# Patient Record
Sex: MALE | Race: WHITE
[De-identification: names, ages, dates, MRNs, and addresses within clinical notes are randomized per-mention and may not be internally consistent; named-entity substitution may affect disease eponyms.]

---

## 2018-05-20 ENCOUNTER — HOSPITAL ENCOUNTER (EMERGENCY)
Dept: HOSPITAL 41 - JD.ED | Age: 33
Discharge: NURSING, REHAB OR LONG-TERM CARE | End: 2018-05-20
Payer: COMMERCIAL

## 2018-05-20 DIAGNOSIS — S42.192A: ICD-10-CM

## 2018-05-20 DIAGNOSIS — S12.600A: ICD-10-CM
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
## 2018-05-20 DIAGNOSIS — S22.42XA: Primary | ICD-10-CM

## 2018-05-20 DIAGNOSIS — Z88.8: ICD-10-CM

## 2018-05-20 PROCEDURE — 71260 CT THORAX DX C+: CPT

## 2018-05-20 PROCEDURE — 85730 THROMBOPLASTIN TIME PARTIAL: CPT

## 2018-05-20 PROCEDURE — 85027 COMPLETE CBC AUTOMATED: CPT

## 2018-05-20 PROCEDURE — 70450 CT HEAD/BRAIN W/O DYE: CPT

## 2018-05-20 PROCEDURE — 72125 CT NECK SPINE W/O DYE: CPT

## 2018-05-20 PROCEDURE — 99285 EMERGENCY DEPT VISIT HI MDM: CPT

## 2018-05-20 PROCEDURE — 96374 THER/PROPH/DIAG INJ IV PUSH: CPT

## 2018-05-20 PROCEDURE — 96361 HYDRATE IV INFUSION ADD-ON: CPT

## 2018-05-20 PROCEDURE — 36415 COLL VENOUS BLD VENIPUNCTURE: CPT

## 2018-05-20 PROCEDURE — 80053 COMPREHEN METABOLIC PANEL: CPT

## 2018-05-20 PROCEDURE — 85007 BL SMEAR W/DIFF WBC COUNT: CPT

## 2018-05-20 PROCEDURE — 85610 PROTHROMBIN TIME: CPT

## 2018-05-20 PROCEDURE — 71045 X-RAY EXAM CHEST 1 VIEW: CPT

## 2018-05-20 PROCEDURE — 81001 URINALYSIS AUTO W/SCOPE: CPT

## 2018-05-20 PROCEDURE — 74177 CT ABD & PELVIS W/CONTRAST: CPT
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
## 2018-05-21 NOTE — CT
Head CT

 

Technique: Multiple axial sections through the brain were obtained.  

Intravenous contrast was not utilized.

 

Comparison: No previous intracranial imaging.

 

Findings: Ventricles along the basal cisterns and sulci over the 

convexities are within normal limits for the patient's age.  No 

abnormal parenchymal densities are seen.  No evidence of intracranial 

hemorrhage.  No midline shift or mass effect is seen.

 

Bone window settings were reviewed which show no discrete calvarial 

abnormality.  Visualized sinuses are clear.

 

Impression:

1.  Nothing acute is seen on noncontrast head CT exam.

 

Diagnostic code #1

 

I agree with preliminary report from [facility], finalized at 05/20/18, 4:21

 PM Central Time

## 2018-05-21 NOTE — CT
CT cervical spine

 

Technique: Multiple axial sections were obtained from above C1 

inferiorly to the bottom of T2.  Reconstructed sagittal and coronal 

images were reviewed.

 

Comparison: No previous cervical spine imaging.

 

Findings: Vertebral body heights and disc spaces are maintained.  

Posterior skull base is intact.  No definite fracture is seen.  No 

bony central or bony neural foraminal stenosis is seen.  Mild 

scoliosis is noted.  Fracture noted within the posterior second left 

rib.

 

Impression:

1.  Incidental scoliosis.  Rib fracture within the posterior left 

second rib.

2.  Nothing acute is otherwise seen on CT study of the cervical spine.

 

Note: Preliminary report by [facility] questions a nondisplaced fracture to 

the transverse process of C7 which I believe is most likely artifact. 

 

 

Diagnostic code #3

 

 

I agree with preliminary report from [facility], finalized at 05/20/18, 4:32

 PM Central Time

## 2018-05-21 NOTE — CT
CT chest

 

Technique: Multiple axial sections were obtained from above the lung 

apices inferiorly through the lung bases.  Intravenous contrast was 

utilized.

 

Comparison: Prior chest x-ray performed on the same day (2:51 PM).

 

Findings: Mediastinum and hilar regions appear within normal limits.  

No pericardial fluid is seen.  Small collection of air is seen within 

the left upper lung adjacent to mediastinum.  Uncertain if this is 

mediastinal in location versus subpleural bleb versus a small 

loculated pneumothorax.  There is very minimal pneumothorax seen 

posteriorly within the left chest.  Lucency is seen posteriorly within

 the right lung believed to represent artifact.  Slight atelectasis is

 seen posteriorly.  Small subpleural nodule is noted within the left 

upper chest measuring 4 mm which is likely incidental.  Lungs  

otherwise are clear.

 

Mildly displaced and comminuted left clavicle fracture is noted.  

Scapular fracture seen within the body of the left scapula.

 

Fracture is identified within the second rib in 2 places.  Displaced 

fracture seen within the posterior third rib.  No additional rib 

fracture is noted.  No acute appearing compression deformity is seen 

within the thoracic spine.  No abnormal subluxation is seen within the

 thoracic spine.

 

Impression:

1.  Fracture within the left clavicle which is comminuted and 

displaced.  Displaced fracture within the left posterior third rib and

 nondisplaced fracture in 2 locations within the left second rib.

2.  Fracture within the body of the left scapula.

4.  Minimal left sided pneumothorax as described above.  

 

Diagnostic code #5

 

I agree with preliminary report from [facility], finalized at 05/20/18, 4:33

 PM Central Time

 

 

 

CT abdomen and pelvis

 

Technique: Multiple axial sections were obtained from above the dome 

of the diaphragm inferiorly through the pubic symphysis.  Intravenous 

contrast was utilized.  No oral contrast has been given.

 

Comparison: No prior abdominal imaging.

 

Findings: Liver shows no focal parenchymal abnormality.  Fractured 

spleen is seen within the splenic and subcapsular hematoma.  Finding 

is compatible with a grade 4-5 trauma scale.  Blood is identified 

within the pelvis.

 

Horseshoe kidney identified.  No renal injury is seen.  Pancreas 

appears within normal limits.  Aorta shows no aneurysmal dilatation.  

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  No 

retroperitoneal adenopathy or mesenteric abnormalities are seen.

 

Bone window setting show no discrete fracture within the lumbar spine 

or within the pelvis.

 

Impression:

1.  Fractured spleen with intrasplenic and subcapsular hematoma as 

well as blood within the pelvis.  Splenic injury correlates to a grade

 4-5 trauma scale.

2.  Horseshoe kidney which appears without trauma.

3.  No other acute abnormality seen on CT study of the abdomen and 

pelvis.  

 

Diagnostic code #5

 

I agree with preliminary report from [facility], finalized at 05/20/18, 4:41

 PM Central Time

## 2018-05-21 NOTE — CR
Chest: Frontal view of the chest was obtained.

 

Comparison: No previous study.

 

Fracture seen within the left clavicle.  Heart size and mediastinum 

are normal.  Lungs are clear.  Displaced fracture is seen within the 

left third rib.

 

Impression:

1.  Fracture of the left clavicle and left third rib.

2.  Nothing acute is otherwise seen on frontal chest x-ray.

 

Diagnostic code #3

 

I agree with preliminary report from [facility], finalized at 05/20/18, 4:44

 PM Central Time

## 2018-06-06 ENCOUNTER — HOSPITAL ENCOUNTER (EMERGENCY)
Dept: HOSPITAL 41 - JD.ED | Age: 33
LOS: 1 days | Discharge: NURSING, REHAB OR LONG-TERM CARE | End: 2018-06-07
Payer: COMMERCIAL

## 2018-06-06 DIAGNOSIS — K21.9: ICD-10-CM

## 2018-06-06 DIAGNOSIS — Z87.891: ICD-10-CM

## 2018-06-06 DIAGNOSIS — K85.91: ICD-10-CM

## 2018-06-06 DIAGNOSIS — J18.9: ICD-10-CM

## 2018-06-06 DIAGNOSIS — I82.890: ICD-10-CM

## 2018-06-06 DIAGNOSIS — Z79.899: ICD-10-CM

## 2018-06-06 DIAGNOSIS — Z88.8: ICD-10-CM

## 2018-06-06 DIAGNOSIS — I81: Primary | ICD-10-CM

## 2018-06-06 PROCEDURE — 96375 TX/PRO/DX INJ NEW DRUG ADDON: CPT

## 2018-06-06 PROCEDURE — 74177 CT ABD & PELVIS W/CONTRAST: CPT

## 2018-06-06 PROCEDURE — 81001 URINALYSIS AUTO W/SCOPE: CPT

## 2018-06-06 PROCEDURE — 96365 THER/PROPH/DIAG IV INF INIT: CPT

## 2018-06-06 PROCEDURE — 85007 BL SMEAR W/DIFF WBC COUNT: CPT

## 2018-06-06 PROCEDURE — 86140 C-REACTIVE PROTEIN: CPT

## 2018-06-06 PROCEDURE — 96368 THER/DIAG CONCURRENT INF: CPT

## 2018-06-06 PROCEDURE — 96367 TX/PROPH/DG ADDL SEQ IV INF: CPT

## 2018-06-06 PROCEDURE — 83735 ASSAY OF MAGNESIUM: CPT

## 2018-06-06 PROCEDURE — 71260 CT THORAX DX C+: CPT

## 2018-06-06 PROCEDURE — 80053 COMPREHEN METABOLIC PANEL: CPT

## 2018-06-06 PROCEDURE — 85610 PROTHROMBIN TIME: CPT

## 2018-06-06 PROCEDURE — 83690 ASSAY OF LIPASE: CPT

## 2018-06-06 PROCEDURE — 99285 EMERGENCY DEPT VISIT HI MDM: CPT

## 2018-06-06 PROCEDURE — 87040 BLOOD CULTURE FOR BACTERIA: CPT

## 2018-06-06 PROCEDURE — 85027 COMPLETE CBC AUTOMATED: CPT

## 2018-06-06 PROCEDURE — 85730 THROMBOPLASTIN TIME PARTIAL: CPT

## 2018-06-06 PROCEDURE — 36415 COLL VENOUS BLD VENIPUNCTURE: CPT

## 2018-06-07 NOTE — CT
CT chest

 

Technique: Multiple axial sections were obtained from above the lung 

apices inferiorly through the lung bases.  Intravenous contrast was 

utilized.

 

Comparison: Prior chest CT of 05/20/18.

 

Small left sided pleural effusion is seen.  No right sided pleural 

effusion is noted.  Mediastinum and hilar regions are within normal 

limits.  Patchy areas of increased parenchymal density are seen within

 the right lung base.  Differential includes pneumonia as well as 

pulmonary contusion.  Mild atelectasis noted within the left lung 

base.  Small nodule noted within the lingula in a subpleural location 

measuring about 4 mm.  This is noted on prior study and appears 

without change but interval is not sufficient to determine complete 

stability.  Lungs otherwise are clear.

 

Bone window settings were reviewed which show a healing left scapular 

fracture.  Healing fracture noted within the left second and third 

ribs.  No acute rib abnormality is seen.  Plate and screws are 

identified within previous left clavicle fracture.  Anterior wedge 

deformity seen within several mid to lower thoracic vertebral bodies 

which are felt to be old.

 

Impression:

1.  Small left-sided pleural effusion with left basilar atelectasis.

2.  Patchy increased density within the right lung base either due to 

pulmonary contusion or pneumonia.

3.  Healing left upper rib fractures and left scapular fracture as 

well as plate and screws within previous left clavicle fracture.

4.  Small 4 mm nodule within the left upper chest is stable from prior

 chest CT but interval is not sufficient to determine complete 

stability.  Noncontrast chest CT could be considered in one year.

 

Diagnostic code #9

 

I agree with preliminary report from [facility], please see above report for

 additional details, preliminary report finalized at 06/07/18, 3:31 AM

 Central Time

 

 

 

CT abdomen and pelvis

 

Technique: Multiple axial sections were obtained from above the dome 

of the diaphragm inferiorly through the pubic symphysis.  Intravenous 

contrast was utilized.  No oral contrast has been given.

 

Comparison: Prior CT abdomen and pelvis exam of 05/20/18.

 

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  Infarct noted 

within the spleen.  Thrombus seen within the portal vein which is not 

unexpected.  No abnormal fluid collections are seen around the spleen.

  Liver shows no focal abnormality.  Adrenal glands show no nodule.  

Pancreas is normal.  Gallbladder contains no calcified gallstones.  

Incidental duplicated inferior vena cava.  Aorta shows no aneurysmal 

dilatation.  No retroperitoneal adenopathy or mesenteric abnormalities

 are seen.  Partial kidney is noted showing no hydronephrosis or mass.

  No pelvic mass or adenopathy is seen.  No free fluid or inflammatory

 changes seen.

 

Delayed images show contrast excretion from both kidneys with contrast

 seen within the nondilated ureters and within the bladder.  Bladder 

wall is mildly thickened most likely due to lack of distention.

 

Bone window settings were reviewed which appear within normal limits.

 

Impression:

1.  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 

and thrombus also noted within the portal vein with these findings not

 being unexpected for embolization.

2.  Other normal findings as noted above.  Nothing acute is 

appreciated.

 

Diagnostic code #2

 

I agree with preliminary report from [facility], finalized at 06/07/18, 3:31

 AM Central Time
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
## 2021-10-26 NOTE — CT
CT chest

 

Technique: Multiple axial sections were obtained from above the lung 

apices inferiorly through the lung bases.  Intravenous contrast was 

utilized.  Reconstructed coronal and sagittal images were obtained.

 

Comparison: Prior CT chest of 06/07/18.

 

Findings: Thoracic aorta shows no aneurysm or dissection.  No aortic 

atherosclerotic ulcer is seen.  Mediastinum shows no adenopathy.  No 

axillary adenopathy is seen.  No pericardial thickening is seen.

 

Lung window settings were reviewed.  Subpleural bleb is seen within 

the left upper lung.  Minimal linear densities within the posterior 

right lung base most likely due to minimal atelectasis.  Two small 

nodules are seen within the lingula measuring less than 5 mm.  Both of

 these are stable from prior chest CT and therefore incidental.  No 

additional abnormality is seen within either lung.

 

Bone window settings were reviewed which show mild degenerative change

 within the spine.  Mild compression deformity noted within the lower 

thoracic spine which is old.  Several old left-sided rib fractures are

 seen which appear healed.  Orthopedic hardware is noted within the 

left clavicle.

 

Impression:

1.  No findings of aneurysm or dissection are seen within the thoracic

 aorta.

2.  Other findings believed to be chronic as described above.

 

Diagnostic code #2

 

I agree with preliminary report from [facility], finalized on 10/25/21, 8:04

 PM CDT, code 1

 

 

 

CT abdomen and pelvis

 

Technique: Multiple axial sections were obtained from above the dome 

of the diaphragm inferiorly through the pubic symphysis.  Intravenous 

contrast was utilized.  No oral contrast has been given.

 

Comparison: Prior CT abdomen and pelvis study of 06/07/18.

 

Findings: Liver contains no focal abnormality.  Spleen size is normal.

  Minimal hiatal hernia is seen.  Adrenal glands show no nodule.  

Pancreas is within normal limits.  Gallbladder contains no calcified 

gallstones.  Kidneys show horseshoe configuration.  Normal enhancement

 is seen.  No hydronephrosis or mass is noted.

 

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  Abdominal aorta 

shows no aneurysm.  Branch vessels of the abdominal aorta show no 

discrete stenosis.  No mesenteric abnormalities are seen.  No pelvic 

mass or adenopathy is appreciated.

 

Bone window settings were reviewed which appear within normal limits 

for the patient's age.

 

Impression:

1.  Abdominal aorta appears within normal limits with no evidence of 

aneurysm or dissection.

2.  Other findings as described above which are felt to be incidental.

 

Diagnostic code #2

 

I agree with preliminary report from [facility], finalized on 10/25/21, 8:04

 PM CDT, code 1
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
## 2021-11-11 NOTE — CR
Chest: Portable view of the chest was obtained.

 

Comparison: Prior chest x-ray of 05/20/18.

 

Heart size and mediastinum are within normal limits.  Slightly limited

 inspiratory effort is seen.  Mild increased density within the right 

and left lung bases are seen most likely due to atelectasis.  Lungs 

otherwise are clear.  Plate and screws affixing old healed left 

clavicle fracture are noted.

 

Impression:

1.  Limited inspiratory study.  Probable basilar atelectasis on both 

sides.

2.  Nothing acute is suspected on portable chest x-ray.

 

Diagnostic code #2

## 2021-11-11 NOTE — CT
CT chest

 

Technique: Multiple axial sections through the chest were obtained.  

Intravenous contrast was utilized.  Study has been performed as a 

pulmonary angiogram protocol.

 

Comparison: Prior chest CT of 10/25/21.

 

Findings: Pulmonary arteries are well-opacified.  No filling defects 

are seen to indicate pulmonary embolism.  

 

Thoracic aorta shows no aneurysm.  Mediastinum shows small lymph nodes

 which are felt to be within normal limits.  No axillary adenopathy is

 seen.  

 

Small right-sided pleural effusion is seen.  There is also increased 

density within the right lung base.  Slight increased density within 

the lingula and left lung base is seen.  

 

Small pleural-based nodule is seen within the right lung base 

measuring 3.5 mm.

 

Small portion of the visualized upper abdominal structures show a 

metallic density within the left upper abdomen which is stable from 

prior exam.  Nothing acute is otherwise seen.

 

Bone window settings were reviewed which show mild scattered disc 

space narrowing within the spine.  Several deformities are noted 

within ribs on both sides which are compatible with old healed rib 

fractures.

 

Impression:

1.  No findings of pulmonary embolism.

2.  Small right-sided pleural effusion is seen.  Increased density is 

noted within the right lung base.  Slight increased density within the

 lingula and left lung base are noted.  Difficult to exclude pneumonia

 if patient has infectious symptoms.

3.  Small pleural-based nodule measuring 3.5 mm within the right lung 

base.

4.  Other incidental findings as described above.

 

Diagnostic code #3